# Patient Record
Sex: MALE | URBAN - METROPOLITAN AREA
[De-identification: names, ages, dates, MRNs, and addresses within clinical notes are randomized per-mention and may not be internally consistent; named-entity substitution may affect disease eponyms.]

---

## 2018-05-25 ENCOUNTER — IMPORTED ENCOUNTER (OUTPATIENT)
Dept: URBAN - METROPOLITAN AREA CLINIC 1 | Facility: CLINIC | Age: 43
End: 2018-05-25

## 2018-05-25 PROBLEM — H52.4: Noted: 2018-05-25

## 2018-05-25 PROBLEM — H52.13: Noted: 2018-05-25

## 2018-05-25 PROCEDURE — 92015 DETERMINE REFRACTIVE STATE: CPT

## 2018-05-25 PROCEDURE — 92004 COMPRE OPH EXAM NEW PT 1/>: CPT

## 2018-05-25 NOTE — PATIENT DISCUSSION
1. Myopia -- Rx was given for correction if indicated and requested2. Presbyopia3. TUCKER -- Recommend frequent use of OTC AT's BID-QID OU (sample given) Return for an appointment in 1 YR 40 with Dr. Christine Polanco.

## 2022-04-03 ASSESSMENT — TONOMETRY
OS_IOP_MMHG: 14
OD_IOP_MMHG: 14

## 2022-04-03 ASSESSMENT — VISUAL ACUITY
OD_SC: 20/20
OS_SC: 20/20

## 2024-01-09 ENCOUNTER — TELEPHONE (OUTPATIENT)
Age: 49
End: 2024-01-09

## 2024-01-09 NOTE — TELEPHONE ENCOUNTER
9/14-LVM  9/28-Disc  01/09/24- LVM for patient to call back and schedule a New Patient  DX: Fatty Liver  Elevated Liver  Ref: Millicent Ramos  Referral scanned and documented.